# Patient Record
Sex: FEMALE | URBAN - METROPOLITAN AREA
[De-identification: names, ages, dates, MRNs, and addresses within clinical notes are randomized per-mention and may not be internally consistent; named-entity substitution may affect disease eponyms.]

---

## 2021-09-20 ENCOUNTER — OFFICE VISIT (OUTPATIENT)
Dept: URBAN - METROPOLITAN AREA CLINIC 6 | Facility: CLINIC | Age: 66
End: 2021-09-20
Payer: COMMERCIAL

## 2021-09-20 DIAGNOSIS — H25.812 COMBINED FORMS OF AGE-RELATED CATARACT, LEFT EYE: ICD-10-CM

## 2021-09-20 DIAGNOSIS — H43.813 VITREOUS DEGENERATION, BILATERAL: ICD-10-CM

## 2021-09-20 DIAGNOSIS — H40.051 OCULAR HYPERTENSION, RIGHT EYE: ICD-10-CM

## 2021-09-20 DIAGNOSIS — E11.9 DIABETES MELLITUS TYPE 2 WITHOUT MENTION OF COMPLICATION: Primary | ICD-10-CM

## 2021-09-20 PROCEDURE — 92014 COMPRE OPH EXAM EST PT 1/>: CPT | Performed by: OPTOMETRIST

## 2021-09-20 RX ORDER — DORZOLAMIDE HYDROCHLORIDE AND TIMOLOL MALEATE 20; 5 MG/ML; MG/ML
SOLUTION/ DROPS OPHTHALMIC
Qty: 10 | Refills: 3 | Status: ACTIVE
Start: 2021-09-20

## 2021-09-20 ASSESSMENT — INTRAOCULAR PRESSURE
OD: 36
OS: 16

## 2021-09-20 NOTE — IMPRESSION/PLAN
Impression: Ocular hypertension, right eye: H40.051. Plan: IOP elevated OD with Cospot but the patient states that she has been running low and has not been consistent and did not use today. May also be artificially elevated due to severe corneal edema. Restart Cosopt BID and reheck in 1-2 weeks.

## 2021-09-20 NOTE — IMPRESSION/PLAN
Impression: Combined forms of age-related cataract, left eye: H25.812. Plan: Cataract Left eye: Observe for now without intervention. The patient was advised to contact us if any change or worsening of vision.

## 2021-09-20 NOTE — IMPRESSION/PLAN
Impression: Unspecified corneal edema: H18.20. Plan: Corneal edema right eye longstanding - Comfortable. Restart Estrellita 128 ointment. Monitor for now. Recheck with Dr Darrel Paul in 1-2 weeks. Essentially monocular LT eye - discussed with patient the importance of protecting the left eye and avoiding any dangerous activities which may put the eye at risk. Wear ocular protection when appropriate and contact our office immediately with any changes in vision.

## 2021-09-20 NOTE — IMPRESSION/PLAN
Impression: Vitreous degeneration, bilateral: H43.813. Plan: Posterior vitreous detachment (floater) left eye - reviewed the signs and symptoms of possible retinal detachment (flashes, floaters, change in peripheral vision, etc). Advised to contact us immediately for any of these or other new symptoms.

## 2021-09-20 NOTE — IMPRESSION/PLAN
Impression: Diabetes mellitus Type 2 without mention of complication: X12.2. Plan: Diabetes Mellitus Type II without signs of diabetic retinopathy left eye - Discussed the pathophysiology of diabetes and its effect on the eye. Stressed the importance of strong glucose control. Advised of importance of at least annual dilated examinations, and to contact us immediately for any problems or concerns.

## 2021-09-28 ENCOUNTER — OFFICE VISIT (OUTPATIENT)
Dept: URBAN - METROPOLITAN AREA CLINIC 6 | Facility: CLINIC | Age: 66
End: 2021-09-28
Payer: COMMERCIAL

## 2021-09-28 DIAGNOSIS — H18.20 UNSPECIFIED CORNEAL EDEMA: Primary | ICD-10-CM

## 2021-09-28 PROCEDURE — 92012 INTRM OPH EXAM EST PATIENT: CPT | Performed by: OPHTHALMOLOGY

## 2021-09-28 RX ORDER — DORZOLAMIDE HYDROCHLORIDE AND TIMOLOL MALEATE 20; 5 MG/ML; MG/ML
SOLUTION/ DROPS OPHTHALMIC
Qty: 10 | Refills: 6 | Status: ACTIVE
Start: 2021-09-28

## 2021-09-28 ASSESSMENT — INTRAOCULAR PRESSURE
OS: 13
OD: 19

## 2021-09-28 NOTE — IMPRESSION/PLAN
Impression: Unspecified corneal edema: H18.20. Plan: Corneal edema right eye longstanding - Comfortable. Cont Estrellita 128 ointment. No PK given NLP. monocular LT eye - discussed with patient the importance of protecting the left eye and avoiding any dangerous activities which may put the eye at risk. Wear ocular protection when appropriate and contact our office immediately with any changes in vision.